# Patient Record
Sex: FEMALE | Race: WHITE
[De-identification: names, ages, dates, MRNs, and addresses within clinical notes are randomized per-mention and may not be internally consistent; named-entity substitution may affect disease eponyms.]

---

## 2020-02-12 ENCOUNTER — HOSPITAL ENCOUNTER (INPATIENT)
Dept: HOSPITAL 54 - ER | Age: 55
LOS: 2 days | Discharge: HOME | DRG: 241 | End: 2020-02-14
Attending: INTERNAL MEDICINE | Admitting: INTERNAL MEDICINE
Payer: COMMERCIAL

## 2020-02-12 VITALS — SYSTOLIC BLOOD PRESSURE: 123 MMHG | DIASTOLIC BLOOD PRESSURE: 86 MMHG

## 2020-02-12 VITALS — HEIGHT: 62 IN | BODY MASS INDEX: 39.97 KG/M2 | WEIGHT: 217.19 LBS

## 2020-02-12 VITALS — DIASTOLIC BLOOD PRESSURE: 86 MMHG | SYSTOLIC BLOOD PRESSURE: 123 MMHG

## 2020-02-12 VITALS — DIASTOLIC BLOOD PRESSURE: 61 MMHG | SYSTOLIC BLOOD PRESSURE: 109 MMHG

## 2020-02-12 VITALS — DIASTOLIC BLOOD PRESSURE: 73 MMHG | SYSTOLIC BLOOD PRESSURE: 108 MMHG

## 2020-02-12 DIAGNOSIS — K26.0: Primary | ICD-10-CM

## 2020-02-12 DIAGNOSIS — K86.1: ICD-10-CM

## 2020-02-12 DIAGNOSIS — Z86.59: ICD-10-CM

## 2020-02-12 DIAGNOSIS — K31.89: ICD-10-CM

## 2020-02-12 DIAGNOSIS — K29.70: ICD-10-CM

## 2020-02-12 DIAGNOSIS — Z79.899: ICD-10-CM

## 2020-02-12 DIAGNOSIS — Z98.890: ICD-10-CM

## 2020-02-12 DIAGNOSIS — K31.1: ICD-10-CM

## 2020-02-12 DIAGNOSIS — E66.01: ICD-10-CM

## 2020-02-12 DIAGNOSIS — K44.9: ICD-10-CM

## 2020-02-12 DIAGNOSIS — K21.0: ICD-10-CM

## 2020-02-12 LAB
ALBUMIN SERPL BCP-MCNC: 3.4 G/DL (ref 3.4–5)
ALP SERPL-CCNC: 78 U/L (ref 46–116)
ALT SERPL W P-5'-P-CCNC: 31 U/L (ref 12–78)
APPEARANCE UR: CLEAR
AST SERPL W P-5'-P-CCNC: 14 U/L (ref 15–37)
BASOPHILS # BLD AUTO: 0 /CMM (ref 0–0.2)
BASOPHILS NFR BLD AUTO: 0.4 % (ref 0–2)
BILIRUB DIRECT SERPL-MCNC: 0 MG/DL (ref 0–0.2)
BILIRUB SERPL-MCNC: 0.1 MG/DL (ref 0.2–1)
BILIRUB UR QL STRIP: NEGATIVE
BUN SERPL-MCNC: 17 MG/DL (ref 7–18)
CALCIUM SERPL-MCNC: 9 MG/DL (ref 8.5–10.1)
CHLORIDE SERPL-SCNC: 100 MMOL/L (ref 98–107)
CO2 SERPL-SCNC: 30 MMOL/L (ref 21–32)
COLOR UR: (no result)
CREAT SERPL-MCNC: 1.1 MG/DL (ref 0.6–1.3)
EOSINOPHIL NFR BLD AUTO: 2.5 % (ref 0–6)
GLUCOSE SERPL-MCNC: 104 MG/DL (ref 74–106)
GLUCOSE UR STRIP-MCNC: NEGATIVE MG/DL
HCT VFR BLD AUTO: 40 % (ref 33–45)
HGB BLD-MCNC: 13.5 G/DL (ref 11.5–14.8)
HGB UR QL STRIP: NEGATIVE ERY/UL
KETONES UR STRIP-MCNC: NEGATIVE MG/DL
LEUKOCYTE ESTERASE UR QL STRIP: NEGATIVE
LYMPHOCYTES NFR BLD AUTO: 1.3 /CMM (ref 0.8–4.8)
LYMPHOCYTES NFR BLD AUTO: 17.4 % (ref 20–44)
MCHC RBC AUTO-ENTMCNC: 34 G/DL (ref 31–36)
MCV RBC AUTO: 92 FL (ref 82–100)
MONOCYTES NFR BLD AUTO: 0.6 /CMM (ref 0.1–1.3)
MONOCYTES NFR BLD AUTO: 7.4 % (ref 2–12)
NEUTROPHILS # BLD AUTO: 5.5 /CMM (ref 1.8–8.9)
NEUTROPHILS NFR BLD AUTO: 72.3 % (ref 43–81)
NITRITE UR QL STRIP: NEGATIVE
PH UR STRIP: 8.5 [PH] (ref 5–8)
PLATELET # BLD AUTO: 316 /CMM (ref 150–450)
POTASSIUM SERPL-SCNC: 3.6 MMOL/L (ref 3.5–5.1)
PROT SERPL-MCNC: 6.8 G/DL (ref 6.4–8.2)
PROT UR QL STRIP: NEGATIVE MG/DL
RBC # BLD AUTO: 4.4 MIL/UL (ref 4–5.2)
SODIUM SERPL-SCNC: 139 MMOL/L (ref 136–145)
UROBILINOGEN UR STRIP-MCNC: 0.2 EU/DL
WBC NRBC COR # BLD AUTO: 7.6 K/UL (ref 4.3–11)

## 2020-02-12 PROCEDURE — C9113 INJ PANTOPRAZOLE SODIUM, VIA: HCPCS

## 2020-02-12 PROCEDURE — G0378 HOSPITAL OBSERVATION PER HR: HCPCS

## 2020-02-12 RX ADMIN — Medication PRN MG: at 15:47

## 2020-02-12 RX ADMIN — SODIUM CHLORIDE PRN MLS/HR: 9 INJECTION, SOLUTION INTRAVENOUS at 12:16

## 2020-02-12 RX ADMIN — Medication PRN MG: at 22:50

## 2020-02-12 RX ADMIN — Medication SCH MG: at 17:00

## 2020-02-12 NOTE — NUR
MS RN OPENING NOTE 



RECEIVED PATIENT IN BED. TOLERATING ROOM ROOM AIR. RESPIRATIONS ARE EVEN AND UNLABORED. NO 
S/S SOB NOTED. DENIES PAIN AT THIS TIME. IN NO APPARENT DISTRESS. IV ACCESS IN LAC #18 
RUNNING NS@75ML/HR. BED IS LOW AND LOCKED, HOB FLAT, SIDE RAILS UP X2, CALL LIGHT WITHIN 
REACH. WILL CONTINUE TO MONITOR.

## 2020-02-12 NOTE — NUR
PT ASLEEP ON BED EASILY AROUSABLE, AAOX4, NOT IN RESPIRATORY DISTRESS ,V/S 
STABLE, KEPT RESTED AND COMFORTABLE, AWIATING GI FOR CONSULT.

## 2020-02-12 NOTE — NUR
RECEIVED REPORT FROM PADMINI CHRISTENSEN FOR KELI, PT IS AAOX4, NOT IN RESPIRATORY 
DISTRESS, V/S STABLE, KEPT RESTED AND COMFORTABLE, WILL CONTINUE TO MONITOR.

## 2020-02-12 NOTE — NUR
MS RN NOTES

ADMITTED PATIENT FROM ER REPORT GIVEN BY GARRISON WASHINGTON, NO ACUTE DISTRESS, NO SOB NOTED. DENIED 
ANY PAIN AT THIS TIME. IV ACCESS PATENT AND INTACT, NO REDNESS NO SWELLING NOTED. PATIENT 
ALERT ORIENTED X 3. ORIENTED TO THE ROOM. CALL LIGHT PLACED WITHIN REACH. SAFETY MEASURES IN 
PLACE.

## 2020-02-12 NOTE — NUR
MS RN NOTES

PATIENT IN BED ALERT ORIENTED X 4. NO ACUTE DISTRESS NOTED. BREATHING UNLABORED. NO  SOB 
NOTED. NEEDS ATTENDED AND ANTICIPATED. KEPT CLEAN DRY AND COMFORTABLE. SAFETY MEASURES IN 
PLACE. CALL LIGHT  WITHIN REACH.  WILL ENDORSE TO NIGHT NURSE FOR CONTINUITY OF CARE

## 2020-02-12 NOTE — NUR
MS RN NOTE



ADMINISTERED PRN MORPHINE 1MG FOR PAIN 8/10 IN UPPER ABDOMEN. WILL CONTINUE TO MONITOR. 



WASTE WITNESSED BY BRE WASHINGTON. WASTE PLACED IN RX DESTROYER.

## 2020-02-13 VITALS — DIASTOLIC BLOOD PRESSURE: 52 MMHG | SYSTOLIC BLOOD PRESSURE: 96 MMHG

## 2020-02-13 VITALS — SYSTOLIC BLOOD PRESSURE: 110 MMHG | DIASTOLIC BLOOD PRESSURE: 72 MMHG

## 2020-02-13 LAB
ALBUMIN SERPL BCP-MCNC: 2.8 G/DL (ref 3.4–5)
ALP SERPL-CCNC: 67 U/L (ref 46–116)
ALT SERPL W P-5'-P-CCNC: 27 U/L (ref 12–78)
AST SERPL W P-5'-P-CCNC: 13 U/L (ref 15–37)
BASOPHILS # BLD AUTO: 0 /CMM (ref 0–0.2)
BASOPHILS NFR BLD AUTO: 0.7 % (ref 0–2)
BILIRUB SERPL-MCNC: 0.3 MG/DL (ref 0.2–1)
BUN SERPL-MCNC: 13 MG/DL (ref 7–18)
CALCIUM SERPL-MCNC: 8.1 MG/DL (ref 8.5–10.1)
CHLORIDE SERPL-SCNC: 108 MMOL/L (ref 98–107)
CHOLEST SERPL-MCNC: 221 MG/DL (ref ?–200)
CO2 SERPL-SCNC: 28 MMOL/L (ref 21–32)
CREAT SERPL-MCNC: 1 MG/DL (ref 0.6–1.3)
EOSINOPHIL NFR BLD AUTO: 4.2 % (ref 0–6)
GLUCOSE SERPL-MCNC: 93 MG/DL (ref 74–106)
HCT VFR BLD AUTO: 38 % (ref 33–45)
HDLC SERPL-MCNC: 34 MG/DL (ref 40–60)
HGB BLD-MCNC: 12.6 G/DL (ref 11.5–14.8)
LDLC SERPL DIRECT ASSAY-MCNC: 155 MG/DL (ref 0–99)
LYMPHOCYTES NFR BLD AUTO: 1.7 /CMM (ref 0.8–4.8)
LYMPHOCYTES NFR BLD AUTO: 32.4 % (ref 20–44)
MAGNESIUM SERPL-MCNC: 2.1 MG/DL (ref 1.8–2.4)
MCHC RBC AUTO-ENTMCNC: 33 G/DL (ref 31–36)
MCV RBC AUTO: 93 FL (ref 82–100)
MONOCYTES NFR BLD AUTO: 0.4 /CMM (ref 0.1–1.3)
MONOCYTES NFR BLD AUTO: 8 % (ref 2–12)
NEUTROPHILS # BLD AUTO: 2.8 /CMM (ref 1.8–8.9)
NEUTROPHILS NFR BLD AUTO: 54.7 % (ref 43–81)
PHOSPHATE SERPL-MCNC: 3.3 MG/DL (ref 2.5–4.9)
PLATELET # BLD AUTO: 282 /CMM (ref 150–450)
POTASSIUM SERPL-SCNC: 4 MMOL/L (ref 3.5–5.1)
PROT SERPL-MCNC: 5.6 G/DL (ref 6.4–8.2)
RBC # BLD AUTO: 4.04 MIL/UL (ref 4–5.2)
SODIUM SERPL-SCNC: 142 MMOL/L (ref 136–145)
TRIGL SERPL-MCNC: 186 MG/DL (ref 30–150)
WBC NRBC COR # BLD AUTO: 5.1 K/UL (ref 4.3–11)

## 2020-02-13 PROCEDURE — 0W3P8ZZ CONTROL BLEEDING IN GASTROINTESTINAL TRACT, VIA NATURAL OR ARTIFICIAL OPENING ENDOSCOPIC: ICD-10-PCS | Performed by: INTERNAL MEDICINE

## 2020-02-13 RX ADMIN — Medication SCH G: at 21:16

## 2020-02-13 RX ADMIN — Medication SCH G: at 17:29

## 2020-02-13 RX ADMIN — Medication SCH MG: at 08:53

## 2020-02-13 RX ADMIN — Medication PRN MG: at 05:10

## 2020-02-13 RX ADMIN — Medication SCH G: at 12:37

## 2020-02-13 RX ADMIN — Medication PRN MG: at 10:05

## 2020-02-13 RX ADMIN — SODIUM CHLORIDE PRN MLS/HR: 9 INJECTION, SOLUTION INTRAVENOUS at 05:10

## 2020-02-13 RX ADMIN — Medication PRN MG: at 11:09

## 2020-02-13 RX ADMIN — SODIUM CHLORIDE SCH MG: 9 INJECTION, SOLUTION INTRAVENOUS at 08:53

## 2020-02-13 RX ADMIN — Medication SCH MG: at 17:29

## 2020-02-13 NOTE — NUR
MS RN NOTES



PATIENT IN BED, ASLEEP, EASILY AROUSED. ALERT AND ORIENTED X 4. BREATHING EVEN AND UNLABORED 
ON ROOM AIR. SHOWS NO SIGNS OF ACUTE RESPIRATORY DISTRESS. NO ACUTE PAIN. IV ON L HAND 20G. 
PATIENT REFUSING ORDER OF NS. IV IS CLEAN DRY AND INTACT. SHOWS NO SIGNS OF INFILTRATION, NO 
REDNESS. SAFETY PRECAUTIONS IN PLACE. BED IN LOWEST POSITION, LOCKED, AND CALL LIGHT KEPT 
WITHIN REACH. WILL CONTINUE TO MONITOR.

## 2020-02-13 NOTE — NUR
MS RN CLOSING NOTE 



PATIENT IN BED. REMAINS TOLERATING ROOM ROOM AIR. RESPIRATIONS ARE EVEN AND UNLABORED. NO 
SOB NOTED. NO C/O PAIN THROUGHOUT SHIFT. NO DISTRESS NOTED. IV ACCESS MAINTAINED IN LAC #18 
RUNNING NS@75ML/HR. BED IS LOW AND LOCKED, HOB FLAT, SIDE RAILS UP X2, CALL LIGHT WITHIN 
REACH. WILL ENDORSE TO NEXT SHIFT

## 2020-02-13 NOTE — NUR
Spoke with patient, states she currently resides in a single room at the [facility] in [place]. Her mental health  at [facility] is Vitor Lucero. States she is ambulatory 
and independent with adl's. Denies having DME or homehealth. She sees her pcp at the 
[facility]. She will need bus token when discharge. 










-------------------------------------------------------------------------------

Addendum: 02/13/20 at 2020 by MIKIE ALCALA RN

-------------------------------------------------------------------------------

Amended: Links added.

## 2020-02-13 NOTE — NUR
MS RN OPENING NOTE



RECEIVED PATIENT IN BED. AWAKE ALERT AND ORIENTED X4. NO CARDIAC OR RESPIRATORY DISTRESS 
NOTED.PT IS KEPT NPO, SHE IS AWAITING FOR THE EGD. NO SOB NOTED, SATURATING WELL ON ROOM 
AIR. RESPIRATIONS ARE EVEN AND UNLABORED. DENIES PAIN AT THIS TIME. NO COMPLAINTS OF PAIN OR 
DISCOMFORT. PT IS IN NO APPARENT DISTRESS. IV ACCESS IN L HAND #20 RUNNING NS RUNNING 
@75ML/HR. BED IS LOW AND LOCKED, HOB FLAT, SIDE RAILS UP X2, CALL LIGHT WITHIN REACH. WILL 
CONTINUE TO MONITOR.

## 2020-02-13 NOTE — NUR
MS RN CLOSING NOTE



PATIENT IN BED. ASLEEP, AROUSABLE, ALERT AND ORIENTED X4. NO CARDIAC OR RESPIRATORY DISTRESS 
NOTED.NO SOB NOTED, SATURATING WELL ON ROOM AIR. RESPIRATIONS ARE EVEN AND UNLABORED. DENIES 
PAIN AT THIS TIME. NO COMPLAINTS OF PAIN OR DISCOMFORT. PT IS IN NO APPARENT DISTRESS. PT 
STILL REFUSING IV FLUIDS. EXPAINED RISKS AND BENEFITS X3, SHE STILL STATES THAT SHE DOESN'T 
NEED IT.  BED IS LOW AND LOCKED, HOB FLAT, SIDE RAILS UP X2, CALL LIGHT WITHIN REACH.

## 2020-02-14 VITALS — DIASTOLIC BLOOD PRESSURE: 78 MMHG | SYSTOLIC BLOOD PRESSURE: 129 MMHG

## 2020-02-14 RX ADMIN — Medication SCH G: at 17:00

## 2020-02-14 RX ADMIN — SODIUM CHLORIDE SCH MG: 9 INJECTION, SOLUTION INTRAVENOUS at 09:22

## 2020-02-14 RX ADMIN — Medication SCH G: at 12:40

## 2020-02-14 RX ADMIN — Medication SCH MG: at 17:00

## 2020-02-14 RX ADMIN — Medication SCH G: at 08:28

## 2020-02-14 RX ADMIN — Medication SCH MG: at 09:22

## 2020-02-14 NOTE — NUR
DISCHARGED HOME



PT DISCHARGED HOME IN STABLE CONDITION. PT LEFT BY HERSELF AND SHE STATED THAT SHE WILL NOT 
NEED ANY ASSISTANCE WITH TRANSPORTATION. I OFFERED HER BUS PAS/TAXI, SHE STATES THAT SHE 
DOESNT NEED IT ANY THAT SHE STILL HAS ERRANDS TO RUN BEFORE GOING HOME. HOWEVER, PT WAS VERY 
APPRECIATIVE THAT I OFFERED. ALL D/C INSTRUCTIONS AND PAPERWORKS WERE PROVIDED TO THE PT. 
EXPLAINED CURRENT MEDS ORDERED IN [place] TERM. I EXPLAINED TO HER TO MAKE A FOLLOW UP 
APPOINTMENT WITH HER PRIMARY PHYSICIAN IN 1 WEEK OR AS SOON AS POSSIBLE, AND TO BRING COPIES 
OF COUNSULTS AND LABS PROVIDED BY THE HOSPITAL. SHE UNDERSTOOD AND AGREED. PTS VITAL SIGNS 
STABLE. PT LEFT IN STABLE CONDITION. PT LEFT AND WAS VERY HAPPY THAT SHE IS GOING HOME AND 
WAS VERY APPRECIATIVE OF ALL THE CARE THE HOSPITAL PROVIDED.

## 2020-02-14 NOTE — NUR
MS RN NOTES



PATIENT IN BED, ASLEEP, EASILY AROUSED. ALERT AND ORIENTED X 4. BREATHING EVEN AND UNLABORED 
ON ROOM AIR. SHOWS NO SIGNS OF ACUTE RESPIRATORY DISTRESS. NO ACUTE PAIN. IV ON L HAND 20G. 
PATIENT REFUSING ORDER OF NS. IV IS CLEAN DRY AND INTACT. SHOWS NO SIGNS OF INFILTRATION, NO 
REDNESS. EDUCATED PATIENT ON IMPORTANCE OF HYDRATION IV AND MEDICATIONS. PATIENT CONTINUED 
TO REFUSED. SAFETY PRECAUTIONS IN PLACE. BED IN LOWEST POSITION, LOCKED, AND CALL LIGHT KEPT 
WITHIN REACH. WILL ENDORSE TO ONCOMING NURSE.

## 2020-02-17 ENCOUNTER — HOSPITAL ENCOUNTER (EMERGENCY)
Dept: HOSPITAL 54 - ER | Age: 55
Discharge: HOME | End: 2020-02-17
Payer: COMMERCIAL

## 2020-02-17 VITALS — SYSTOLIC BLOOD PRESSURE: 111 MMHG | DIASTOLIC BLOOD PRESSURE: 68 MMHG

## 2020-02-17 VITALS — HEIGHT: 62 IN | BODY MASS INDEX: 40.48 KG/M2 | WEIGHT: 220 LBS

## 2020-02-17 DIAGNOSIS — R10.9: Primary | ICD-10-CM

## 2020-02-17 DIAGNOSIS — Z79.899: ICD-10-CM

## 2020-02-17 NOTE — NUR
BIBRA TO ER BED 4. AAOX4. NO RESP DISTRESS NOTED. AMBULATORY. C/O EPIGASTRIC 
PAIN. PT STATES THAT SHE WAS RECENTLY DISCHARGED FROM ANOTHER HOSPITAL AND GOT 
PRESCRIPTION FOR PROTONIX D/T ULCER. PT HAS NO MEDICATION AND IN PAIN THATS WHY 
SHE CALL 911. MD AWARE. AWAITING EVAL.